# Patient Record
Sex: FEMALE | Race: WHITE
[De-identification: names, ages, dates, MRNs, and addresses within clinical notes are randomized per-mention and may not be internally consistent; named-entity substitution may affect disease eponyms.]

---

## 2020-04-25 ENCOUNTER — HOSPITAL ENCOUNTER (EMERGENCY)
Dept: HOSPITAL 8 - ED | Age: 25
LOS: 1 days | Discharge: PSYCHIATRIC OR SUBSTANCE USE FACILITY | End: 2020-04-26
Payer: COMMERCIAL

## 2020-04-25 VITALS — BODY MASS INDEX: 35.16 KG/M2 | WEIGHT: 198.42 LBS | HEIGHT: 63 IN
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
## 2020-04-25 DIAGNOSIS — T14.91XA: Primary | ICD-10-CM

## 2020-04-25 DIAGNOSIS — F41.9: ICD-10-CM

## 2020-04-25 DIAGNOSIS — T42.4X2A: ICD-10-CM

## 2020-04-25 PROCEDURE — 36415 COLL VENOUS BLD VENIPUNCTURE: CPT

## 2020-04-25 PROCEDURE — 80307 DRUG TEST PRSMV CHEM ANLYZR: CPT

## 2020-04-25 PROCEDURE — 96372 THER/PROPH/DIAG INJ SC/IM: CPT

## 2020-04-25 PROCEDURE — 84703 CHORIONIC GONADOTROPIN ASSAY: CPT

## 2020-04-25 PROCEDURE — 80053 COMPREHEN METABOLIC PANEL: CPT

## 2020-04-25 PROCEDURE — 99285 EMERGENCY DEPT VISIT HI MDM: CPT

## 2020-04-25 PROCEDURE — 85025 COMPLETE CBC W/AUTO DIFF WBC: CPT

## 2020-04-25 NOTE — NUR
24Y F BIB EMS FROM HOME FOR SI/ OD. PT TEXTED FRIEND AND MOTHER THAT SHE TOOK 
TOO MANY PILLS AND WANTS TO DIE. PT ARRIVES IN 2PT RESTRAINTS TEARFUL. PT 
AGREES TO BE COOPERATIVE AND NOT HARM STAFF. PT STS THAT THEY ATTEMPTED TO 
MESSAGE AN EX AN WAS REJECTED TONIGHT. PT STS THEY TOOK ABOUT 10 PILLS OF HER 
ANTI ANXIETY MEDICATION AND IS NOW CONCERNED ABOUT NO LONGER GETTING 
MEDICATION.

## 2020-04-26 VITALS — DIASTOLIC BLOOD PRESSURE: 61 MMHG | SYSTOLIC BLOOD PRESSURE: 109 MMHG

## 2020-04-26 LAB
ALBUMIN SERPL-MCNC: 3.8 G/DL (ref 3.4–5)
ALP SERPL-CCNC: 60 U/L (ref 45–117)
ALT SERPL-CCNC: 23 U/L (ref 12–78)
ANION GAP SERPL CALC-SCNC: 7 MMOL/L (ref 5–15)
BASOPHILS # BLD AUTO: 0.04 X10^3/UL (ref 0–0.1)
BASOPHILS NFR BLD AUTO: 0 % (ref 0–1)
BILIRUB SERPL-MCNC: 0.5 MG/DL (ref 0.2–1)
CALCIUM SERPL-MCNC: 9.2 MG/DL (ref 8.5–10.1)
CHLORIDE SERPL-SCNC: 109 MMOL/L (ref 98–107)
CREAT SERPL-MCNC: 0.8 MG/DL (ref 0.55–1.02)
EOSINOPHIL # BLD AUTO: 0.36 X10^3/UL (ref 0–0.4)
EOSINOPHIL NFR BLD AUTO: 4 % (ref 1–7)
ERYTHROCYTE [DISTWIDTH] IN BLOOD BY AUTOMATED COUNT: 13.6 % (ref 9.6–15.2)
LYMPHOCYTES # BLD AUTO: 3.01 X10^3/UL (ref 1–3.4)
LYMPHOCYTES NFR BLD AUTO: 34 % (ref 22–44)
MCH RBC QN AUTO: 28.6 PG (ref 27–34.8)
MCHC RBC AUTO-ENTMCNC: 32.4 G/DL (ref 32.4–35.8)
MCV RBC AUTO: 88 FL (ref 80–100)
MD: NO
MONOCYTES # BLD AUTO: 0.74 X10^3/UL (ref 0.2–0.8)
MONOCYTES NFR BLD AUTO: 8 % (ref 2–9)
NEUTROPHILS # BLD AUTO: 4.69 X10^3/UL (ref 1.8–6.8)
NEUTROPHILS NFR BLD AUTO: 53 % (ref 42–75)
PLATELET # BLD AUTO: 230 X10^3/UL (ref 130–400)
PMV BLD AUTO: 8.4 FL (ref 7.4–10.4)
PROT SERPL-MCNC: 6.9 G/DL (ref 6.4–8.2)
RBC # BLD AUTO: 4.52 X10^6/UL (ref 3.82–5.3)
VANCOMYCIN TROUGH SERPL-MCNC: < 1.7 MG/DL (ref 2.8–20)

## 2020-04-26 NOTE — NUR
REPORT TO [facility] MEDIC TRANSPORT. BELONGINGS GIVEN TO [facility]. PT AMBULATED FROM 
ER WITH [facility] STAFF.

## 2020-04-26 NOTE — NUR
PT ASKING FOR PHONE, STS SPOUSE WILL USE IT TO ABUSE HER. PT INFORMED THAT SHE 
DID NOT COME IN WITH PHONE. PT SCREAMING IN ROOM ROCKING BACK AND FOURTH. NP 
UPDATED

## 2020-04-26 NOTE — NUR
Pt resting in bed, remains on monitors. Room remains secured, sitter at 
bedside. Diet tray ordered. Cont to monitor.

## 2020-04-26 NOTE — NUR
Spoke with Cadence S[facility] at [facility], Pt to be possible transfer. 
Awaiting MD to accept.

## 2020-04-26 NOTE — NUR
AFTER RECEIVING REPORT ASSUMED CARE. PT IN DIRECT VIEW OF SITTER WITH ALL 
EQUIPMENT SECURED BEHIND PULL DOWN DOORS.

## 2020-04-26 NOTE — NUR
Report recieved from Barbara SOSA. Pt up to RR, slightly unsteady gait, needs 
standby assist. Pt helped back to bed. Pt is calm and cooperative at this time. 
Pt states "I'm just going to disassociate right now." Pt remains on vs 
monitoring, vss. Pt room remains secured, sitter at bedside. Cont to monitor.

## 2020-04-26 NOTE — NUR
Pt resting bed, speaking with sitter, Pt remains calm and cooperative. Pt given 
meal tray. Pt remains on monitors, vss. Cont to monitor.

## 2020-04-26 NOTE — NUR
PT CONTINUES TO SCRATCH SELF AS WELL AS PINCHING SKIN. WHEN ASKED IF ITCHY PT 
STS I CAN'T TELL YOU THAT AND I WONT STOP SCRATCHING. PT STS SHE WON'T ALLOW RN 
TO APPLY RESTRAINTS. NP UPDATED ORDER FOR RESTRAINTS OBTAINED. SECURITY AT 
BEDSIDE. PT PLACED IN 3 PT RESTRAINTS AT THIS TIME.

## 2020-04-26 NOTE — NUR
PT STS SHE WILL BITE HER MOUTH UNTIL SHE BLEEDS AND RIP HER SKIN OFF OF HER 
LIPS. NP AWARE OF COMMENTS MADE AT THIS TIME

## 2020-04-26 NOTE — NUR
[facility] UNTIL THEY LEAVE A NIKITA BECAUSE SHE WANTS TO HURT. PT OBSERVED 
TO BE PULLING AGAINST RESTRAINTS AND IS NOT REDIRECTABLE. MD UPDATED ADDITIONAL 
ORDERS AT THIS TIME.

## 2020-04-26 NOTE — NUR
PT OBSERVED TO BE PULLING AT RESTRAINTS AND ATTEMPTING TO REMOVE THEM. PT TO BE 
PLACED IN 4PTS AT THIS TIME